# Patient Record
Sex: MALE | Race: BLACK OR AFRICAN AMERICAN | NOT HISPANIC OR LATINO | URBAN - METROPOLITAN AREA
[De-identification: names, ages, dates, MRNs, and addresses within clinical notes are randomized per-mention and may not be internally consistent; named-entity substitution may affect disease eponyms.]

---

## 2023-08-01 ENCOUNTER — HOSPITAL ENCOUNTER (EMERGENCY)
Facility: HOSPITAL | Age: 33
Discharge: HOME | End: 2023-08-01
Attending: EMERGENCY MEDICINE

## 2023-08-01 VITALS
WEIGHT: 275 LBS | HEART RATE: 82 BPM | DIASTOLIC BLOOD PRESSURE: 76 MMHG | RESPIRATION RATE: 18 BRPM | BODY MASS INDEX: 37.25 KG/M2 | TEMPERATURE: 98 F | OXYGEN SATURATION: 99 % | SYSTOLIC BLOOD PRESSURE: 154 MMHG | HEIGHT: 72 IN

## 2023-08-01 DIAGNOSIS — L03.012 PARONYCHIA OF FINGER, LEFT: Primary | ICD-10-CM

## 2023-08-01 PROCEDURE — 25000003 PHARM REV CODE 250

## 2023-08-01 PROCEDURE — 99283 EMERGENCY DEPT VISIT LOW MDM: CPT | Mod: 25

## 2023-08-01 PROCEDURE — 10060 I&D ABSCESS SIMPLE/SINGLE: CPT

## 2023-08-01 RX ORDER — AMOXICILLIN AND CLAVULANATE POTASSIUM 875; 125 MG/1; MG/1
1 TABLET, FILM COATED ORAL
Status: COMPLETED | OUTPATIENT
Start: 2023-08-01 | End: 2023-08-01

## 2023-08-01 RX ORDER — AMOXICILLIN AND CLAVULANATE POTASSIUM 875; 125 MG/1; MG/1
1 TABLET, FILM COATED ORAL 2 TIMES DAILY
Qty: 20 TABLET | Refills: 0 | Status: SHIPPED | OUTPATIENT
Start: 2023-08-01

## 2023-08-01 RX ADMIN — BACITRACIN ZINC, NEOMYCIN, POLYMYXIN B 1 EACH: 400; 3.5; 5 OINTMENT TOPICAL at 01:08

## 2023-08-01 RX ADMIN — AMOXICILLIN AND CLAVULANATE POTASSIUM 1 TABLET: 875; 125 TABLET, FILM COATED ORAL at 01:08

## 2023-08-01 NOTE — ED NOTES
Pt c/o left index finger pain and swelling x 3 days.  Neuro intact.  Pt is Aaox3, resp even and unlabored, skin warm and dry.  NAD noted.

## 2023-08-01 NOTE — ED PROVIDER NOTES
Encounter Date: 8/1/2023       History     Chief Complaint   Patient presents with    Finger Pain     Pt reports left 2nd digit pain and swelling x 2 days. Paronychia noted. No drainage at this time.      33-year-old male with no medical history who presents with pain and swelling to his 2nd digit on left hand.  Patient states his pain began about 3 days ago and swelling has increased.  He states that you can see pus under the skin when you palpate the region.  Patient denies any trauma but states that he often bites his fingernails.  Patient denies any difficulty using his hand but states there is proximal spread of swelling to digit.  Patient denies any fever, headache, chest pain, shortness of breath, abdominal pain.  Patient has not had this prior.      Review of patient's allergies indicates:  No Known Allergies  History reviewed. No pertinent past medical history.  History reviewed. No pertinent surgical history.  History reviewed. No pertinent family history.     Review of Systems   Constitutional:  Negative for activity change, appetite change and fever.   HENT:  Negative for congestion, ear pain, facial swelling, sore throat, trouble swallowing and voice change.    Eyes:  Negative for visual disturbance.   Respiratory:  Negative for cough and shortness of breath.    Cardiovascular:  Negative for chest pain.   Gastrointestinal:  Negative for abdominal pain, diarrhea and nausea.   Genitourinary:  Negative for dysuria, hematuria and testicular pain.   Musculoskeletal:  Negative for back pain and myalgias.   Skin:  Negative for rash.        Swelling and pain noted to 2nd digit on left hand around the nailbed.   Neurological:  Negative for dizziness, syncope, weakness, numbness and headaches.   Hematological:  Does not bruise/bleed easily.   Psychiatric/Behavioral:  Negative for confusion.        Physical Exam     Initial Vitals [08/01/23 1256]   BP Pulse Resp Temp SpO2   (!) 156/85 87 16 98 °F (36.7 °C) 99 %       MAP       --         Physical Exam    Nursing note and vitals reviewed.  Constitutional: He appears well-developed and well-nourished.   HENT:   Head: Normocephalic and atraumatic.   Right Ear: External ear normal.   Left Ear: External ear normal.   Nose: Nose normal.   Mouth/Throat: Mucous membranes are normal.   Patent   Eyes: Conjunctivae and EOM are normal. Right conjunctiva is not injected. Left conjunctiva is not injected. No scleral icterus.   sclera anicteric   Neck: Neck supple. No tracheal deviation present.   Normal range of motion.   Full passive range of motion without pain.     Cardiovascular:  Regular rhythm, S1 normal and S2 normal.           Pulses:       Radial pulses are 2+ on the right side and 2+ on the left side.   Pulmonary/Chest: Effort normal and breath sounds normal. No respiratory distress. He has no wheezes.   Abdominal: Abdomen is soft. He exhibits no distension. There is no abdominal tenderness.   Musculoskeletal:         General: Normal range of motion.      Cervical back: Full passive range of motion without pain, normal range of motion and neck supple.      Comments: Good active ROM of all extremities. No lower extremity edema or cyanosis.     Neurological: He is alert and oriented to person, place, and time. No cranial nerve deficit or sensory deficit. Gait normal.   A&Ox4, normal speech   Skin: Skin is warm. Capillary refill takes less than 2 seconds. Abscess (Fluctuant abscess noted to 2nd digit on left hand.  Painful to palpation.  Swelling from distal tip of finger spreading proximally to 2nd knuckle.  There is no pain to palmar side of hand.) noted. No ecchymosis and no rash noted.   Psychiatric: He has a normal mood and affect. Thought content normal.         ED Course   I & D - Incision and Drainage    Date/Time: 8/1/2023 1:26 PM  Location procedure was performed: [facility] EMERGENCY DEPARTMENT    Performed by: Nelly Alcala PA-C  Authorized by: Nelly Alcala PA-C   Assisting provider: Nelly Alcala PA-C  Pre-operative diagnosis: paronychia  Post-operative diagnosis: paronychia  Consent Done: Yes  Consent: Verbal consent obtained.  Risks and benefits: risks, benefits and alternatives were discussed  Consent given by: patient  Patient understanding: patient states understanding of the procedure being performed  Patient consent: the patient's understanding of the procedure matches consent given  Patient identity confirmed: name and verbally with patient  Type: abscess  Body area: upper extremity  Location details: left index finger  Anesthesia: see MAR for details    Anesthesia:  Local Anesthetic: lidocaine 1% without epinephrine  Description of findings: Copious amounts of exudate expressed from incision   Incision type: single straight  Drainage amount: copious  Wound treatment: expression of material, drainage, incision and wound left open  Technical procedures used: incision and drainage  Significant surgical tasks conducted by the assistant(s): n/a  Complications: No  Estimated blood loss (mL): 0  Specimens: No  Implants: No  Comments: Patient tolerated procedure well and expresses relief from discomfort        Labs Reviewed - No data to display       Imaging Results    None          Medications   neomycin-bacitracnZn-polymyxnB packet 1 each (1 each Topical (Top) Given 8/1/23 1358)   amoxicillin-clavulanate 875-125mg per tablet 1 tablet (1 tablet Oral Given 8/1/23 1358)                          Medical Decision Making  33-year-old male with no past medical history presents with a paronychia to 2nd digit on left hand.  Patient states that he noticed pain and swelling 3 days ago that has continued to worsen.  A fluctuant pocket is noted around the proximal nailbed.  Fluctuance was drained with expression of copious amount of purulence and patient reported immediate relief.  Wound was covered with bacitracin and a Band-Aid.  Patient advised to do intermittent warm soaks.   Patient also advised to complete full prescription of Augmentin.  Differential diagnosis includes paronychia, abscess, cellulitis, contusion.     Problems Addressed:  Paronychia of finger, left: acute illness or injury     Details: Fluctuance was drained with expression of copious amount of purulence and patient reported immediate relief.    Risk  OTC drugs.  Prescription drug management.  Diagnosis or treatment significantly limited by social determinants of health.         Clinical Impression:   Final diagnoses:  [L03.012] Paronychia of finger, left (Primary)        ED Disposition Condition    Discharge Stable          ED Prescriptions       Medication Sig Dispense Start Date End Date Auth. Provider    amoxicillin-clavulanate 875-125mg (AUGMENTIN) 875-125 mg per tablet Take 1 tablet by mouth 2 (two) times daily. 20 tablet 8/1/2023 -- Nelly Alcala PA-C          Follow-up Information       Follow up With Specialties Details Why Contact Info     [facility] Mar[facility] Estevez  Schedule an appointment as soon as possible for a visit in 1 week As needed [address]  [place] [address]  [phone]               Nelly Alcala PA-C  08/01/23 1433       Nelly Alcala PA-C  08/01/23 1436

## 2023-08-01 NOTE — DISCHARGE INSTRUCTIONS
Complete antibiotics as prescribed.  Patient educated that this may cause stomach upset and to take with probiotic or yogurt.  Recommended warm soaks to the finger intermittently to help drainage.  Follow-up with primary care as needed.  Return to the Emergency Department for any worsening, change in condition, or any emergent concerns.   Patient agrees with this plan. Discussed strict return precautions, pt verbalized understanding. Patient is stable for discharge.